# Patient Record
Sex: FEMALE | Race: WHITE | Employment: OTHER | URBAN - METROPOLITAN AREA
[De-identification: names, ages, dates, MRNs, and addresses within clinical notes are randomized per-mention and may not be internally consistent; named-entity substitution may affect disease eponyms.]

---

## 2017-01-17 ENCOUNTER — OFFICE VISIT (OUTPATIENT)
Dept: NEUROLOGY | Facility: CLINIC | Age: 82
End: 2017-01-17

## 2017-01-17 VITALS
HEART RATE: 60 BPM | BODY MASS INDEX: 26.4 KG/M2 | HEIGHT: 63 IN | SYSTOLIC BLOOD PRESSURE: 142 MMHG | DIASTOLIC BLOOD PRESSURE: 84 MMHG | WEIGHT: 149 LBS | RESPIRATION RATE: 18 BRPM

## 2017-01-17 DIAGNOSIS — R41.3 MEMORY CHANGE: ICD-10-CM

## 2017-01-17 DIAGNOSIS — R51.9 HEADACHE, CHRONIC DAILY: Primary | ICD-10-CM

## 2017-01-17 PROCEDURE — 99213 OFFICE O/P EST LOW 20 MIN: CPT | Performed by: OTHER

## 2017-01-17 RX ORDER — FUROSEMIDE 20 MG/1
TABLET ORAL
Refills: 3 | COMMUNITY
Start: 2016-12-30

## 2017-01-17 RX ORDER — NORTRIPTYLINE HYDROCHLORIDE 10 MG/1
20 CAPSULE ORAL NIGHTLY
Qty: 60 CAPSULE | Refills: 4 | Status: SHIPPED | OUTPATIENT
Start: 2017-01-17 | End: 2017-07-05

## 2017-01-17 RX ORDER — TRAVOPROST 0.004 %
DROPS OPHTHALMIC (EYE)
Refills: 3 | COMMUNITY
Start: 2016-11-03

## 2017-01-17 RX ORDER — DULOXETIN HYDROCHLORIDE 30 MG/1
30 CAPSULE, DELAYED RELEASE ORAL DAILY
Qty: 30 CAPSULE | Refills: 3 | Status: SHIPPED | OUTPATIENT
Start: 2017-01-17 | End: 2017-05-11

## 2017-01-17 NOTE — PATIENT INSTRUCTIONS
Refill policies:    • Allow 2 business days for refills; controlled substances may take longer.   • Contact your pharmacy at least 5 days prior to running out of medication and have them send an electronic request or submit request through the “request re your physician has recommended that you have a procedure or additional testing performed. [facility] will contact your insurance carrier to obtain pre-certification or prior authorization.     Unfortunately, OLEGARIO has seen an increas

## 2017-01-17 NOTE — PROGRESS NOTES
Neurology H&P    Sharif Curry Patient Status:  No patient class for patient encounter    1935 MRN [id number]   Location ED [facility] Attending No att. providers found   Hosp Day #  PCP [facility]     Subjective:  Original HPI  A things\" helps to relieve HA pain. She takes tylenol for HA pain which helps relieve pain but does not completely relieve pain.  She has had botox treatment is the past (Q 6 months for past 2 years) which she states have variably made the HA better or worse T PO  D Disp:  Rfl: 3   Clopidogrel Bisulfate 75 MG Oral Tab TK ONE T PO D Disp:  Rfl: 2   Irbesartan 300 MG Oral Tab 1/2 tab bid Disp:  Rfl: 3   DULoxetine HCl 20 MG Oral Cap DR Particles TK ONE C PO  D WITH FOOD Disp:  Rfl: 3   Propranolol HCl  MG facial sensation intact, strong eye closure and lower facial muscles & jaw muscles; palate elevation intact, no dysarthria, no tongue fasciculations or atrophy, strong shoulder shrug.     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 Her son states that she is unchanged since August and memory deficits are minimal so he and pt would like to defer any medications such as Donepezil at this time.  We will increase Cymbalta QHS  - Lipid panel at next visit      Willie Grover DO  Neurology

## 2017-05-11 RX ORDER — DULOXETIN HYDROCHLORIDE 30 MG/1
CAPSULE, DELAYED RELEASE ORAL
Qty: 30 CAPSULE | Refills: 2 | Status: SHIPPED | OUTPATIENT
Start: 2017-05-11

## 2017-05-11 NOTE — TELEPHONE ENCOUNTER
Medication: Cymbalta    Date of last refill: 01/17/17  [place]    Last office visit: 01/17/17  Due back to clinic per last office note:  6 weeks  Date next office visit scheduled:  No appointment scheduled at this ti

## 2017-07-05 DIAGNOSIS — R51.9 HEADACHE, CHRONIC DAILY: ICD-10-CM

## 2017-07-05 NOTE — TELEPHONE ENCOUNTER
Patient's daughter, Sridevi Brown (acceptable per HIPPA), requested the patients son be contacted to set up an appointment. LMTCB.  Please assist the patient in setting up an appointment, in order to process refill request.       Medication: Nortriptyline     D with cardiology   - BP goals <140/90   - Cont.  Atorvastatin 10mg QHS  - Lipid panel at next visit

## 2017-07-10 RX ORDER — NORTRIPTYLINE HYDROCHLORIDE 10 MG/1
CAPSULE ORAL
Qty: 60 CAPSULE | Refills: 0 | Status: SHIPPED | OUTPATIENT
Start: 2017-07-10 | End: 2017-08-02

## 2017-07-21 ENCOUNTER — TELEPHONE (OUTPATIENT)
Dept: NEUROLOGY | Facility: CLINIC | Age: 82
End: 2017-07-21

## 2017-08-02 ENCOUNTER — OFFICE VISIT (OUTPATIENT)
Dept: NEUROLOGY | Facility: CLINIC | Age: 82
End: 2017-08-02

## 2017-08-02 VITALS
HEART RATE: 64 BPM | WEIGHT: 145 LBS | DIASTOLIC BLOOD PRESSURE: 70 MMHG | BODY MASS INDEX: 26 KG/M2 | SYSTOLIC BLOOD PRESSURE: 120 MMHG

## 2017-08-02 DIAGNOSIS — F01.50 VASCULAR DEMENTIA WITHOUT BEHAVIORAL DISTURBANCE (HCC): ICD-10-CM

## 2017-08-02 DIAGNOSIS — R51.9 HEADACHE, CHRONIC DAILY: Primary | ICD-10-CM

## 2017-08-02 PROCEDURE — 99213 OFFICE O/P EST LOW 20 MIN: CPT | Performed by: OTHER

## 2017-08-02 RX ORDER — NORTRIPTYLINE HYDROCHLORIDE 25 MG/1
25 CAPSULE ORAL NIGHTLY
Qty: 90 CAPSULE | Refills: 2 | Status: SHIPPED | OUTPATIENT
Start: 2017-08-02

## 2017-08-02 NOTE — PATIENT INSTRUCTIONS
Refill policies:    • Allow 2-3 business days for refills; controlled substances may take longer.   • Contact your pharmacy at least 5 days prior to running out of medication and have them send an electronic request or submit request through the [facility] have a procedure or additional testing performed. [facility]) will contact your insurance carrier to obtain pre-certification or prior authorization.     Unfortunately, OLEGARIO has seen an increase in denial of payment even though the p

## 2017-08-02 NOTE — PROGRESS NOTES
Neurology H&P    Marck Davila Patient Status:  No patient class for patient encounter    1935 MRN [id number]   Location ED [facility] Attending No att. providers found   Hosp Day #  PCP [facility]     Subjective:  Original HPI  A things\" helps to relieve HA pain. She takes tylenol for HA pain which helps relieve pain but does not completely relieve pain.  She has had botox treatment is the past (Q 6 months for past 2 years) which she states have variably made the HA better or worse MG Oral Tab 1/2 tab bid Disp:  Rfl: 3   Propranolol HCl  MG Oral Capsule SR 24 Hr TK 2 CS PO D Disp:  Rfl: 3   RaNITidine HCl 150 MG Oral Tab TK ONE T PO BID Disp:  Rfl: 5   aspirin 81 MG Oral Tab Take 81 mg by mouth daily.  Disp:  Rfl:        Problem SENSORY EXAMINATION:  UE: intact to light touch  LE: intact to light touch    COORDINATION:  No dysmetria, or intention tremors; mildly slowed HIEN    REFLEXES: difficult to assess due to problems relaxing  1+ at biceps, 1+ triceps, 2+ at patella, unabl - We discussed possible side effects in detail       - Pt will call back to clinic in 5-10 days and let me know how they are tolerating this medication    - Continue Cymbalta 30mg Qday for HA and depression and anxiety  - She is already taking Propa

## 2018-08-13 ENCOUNTER — OFFICE VISIT (OUTPATIENT)
Dept: OBGYN CLINIC | Facility: CLINIC | Age: 83
End: 2018-08-13
Payer: MEDICARE

## 2018-08-13 VITALS
WEIGHT: 151 LBS | DIASTOLIC BLOOD PRESSURE: 73 MMHG | HEART RATE: 57 BPM | BODY MASS INDEX: 27 KG/M2 | SYSTOLIC BLOOD PRESSURE: 120 MMHG

## 2018-08-13 DIAGNOSIS — N95.0 POSTMENOPAUSAL BLEEDING: ICD-10-CM

## 2018-08-13 DIAGNOSIS — R10.2 PELVIC PAIN: Primary | ICD-10-CM

## 2018-08-13 PROCEDURE — 99203 OFFICE O/P NEW LOW 30 MIN: CPT | Performed by: OBSTETRICS & GYNECOLOGY

## 2018-08-17 NOTE — PROGRESS NOTES
Houlton Grayson is a 80year old female  No LMP recorded. Patient is postmenopausal. Patient presents with:  Gyn Problem: VAGINAL PELVIC  PAIN -- patient is my daughter. Has dementia.  States mother told her was having vaginal / inguinal discomfort ONE T PO  D, Disp: , Rfl: 3  •  Clopidogrel Bisulfate 75 MG Oral Tab, TK ONE T PO D, Disp: , Rfl: 2  •  Irbesartan 300 MG Oral Tab, 1/2 tab bid, Disp: , Rfl: 3  •  Propranolol HCl  MG Oral Capsule SR 24 Hr, TK 2 CS PO D, Disp: , Rfl: 3  •  RaNITidine or tenderness  Perineum:   normal  Anus:    no hemorroids   Lymph node:   no inguinal lymph nodes    Assessment & Plan:    Sandra Selby was seen today for gyn problem.     Diagnoses and all orders for this visit:    Pelvic pain  -     US PELVIS EV (TRNS ABD AND

## 2018-08-31 ENCOUNTER — HOSPITAL ENCOUNTER (OUTPATIENT)
Dept: ULTRASOUND IMAGING | Facility: HOSPITAL | Age: 83
Discharge: HOME | End: 2018-08-31
Attending: OBSTETRICS & GYNECOLOGY
Payer: MEDICARE

## 2018-08-31 DIAGNOSIS — R10.2 PELVIC PAIN: ICD-10-CM

## 2018-08-31 DIAGNOSIS — N95.0 POSTMENOPAUSAL BLEEDING: ICD-10-CM

## 2018-08-31 PROCEDURE — 76830 TRANSVAGINAL US NON-OB: CPT | Performed by: OBSTETRICS & GYNECOLOGY

## 2018-09-21 NOTE — PROGRESS NOTES
Please call pt and inform her of results attached -- may need to speak to daughter due to pt's dementia -- thin lining inside uterus. If bleeding occurs again, then consider embx but uncertain if mother will tolerate. They could not seen ovaries.  Patient h

## 2018-11-02 ENCOUNTER — TELEPHONE (OUTPATIENT)
Dept: NEUROLOGY | Facility: CLINIC | Age: 83
End: 2018-11-02

## 2018-11-02 NOTE — TELEPHONE ENCOUNTER
Per Dr. Bing Nick:     \"I called the patients son Tamar Byrd on the phone regarding increased aggression. HE did not answer and there was no answering machine to leave a message.  I will not prescribe any medication until the patient is seen in clinic as I have n

## 2018-11-02 NOTE — TELEPHONE ENCOUNTER
Dx with vascular dementia, personality has seemed to change very angry and mean. Is this behavior something that can be treated by medication? Please advise      LOV:   Assessment:   This is an [de-identified] y/o female with h/o TIA (transient slurred speech and aph

## 2018-11-02 NOTE — TELEPHONE ENCOUNTER
vascular dementia   personality has seemed to change  very angry and mean. Is this behavior something that can be treated by medication? Please advise.

## 2018-11-02 NOTE — TELEPHONE ENCOUNTER
I called the patients son Óscar Ng on the phone regarding increased aggression. HE did not answer and there was no answering machine to leave a message. I will not prescribe any medication until the patient is seen in clinic as I have not seen her in >1 year.

## 2018-11-07 NOTE — TELEPHONE ENCOUNTER
Patient'son notified, verbalized understanding and had no further questions/concerns. States patient has an appt coming up with her pcp so he will discuss at that time and if needed he will call back to schedule a follow up with Dr. Bing Nick.

## (undated) NOTE — MR AVS SNAPSHOT
[address] [facility] [address] [address] [phone]               Thank you for choosing us for your health care visit with David Strong DO.   We are glad to serve you and happy to provide you with [facility] family member will be picking up prescription, office must be given name of individual in advance and they must present an ID as well. ? The name of the person picking up your prescription must be documented in your chart.   Scheduling Tests    If your phy BP Pulse Height Weight BMI    142/84 mmHg 60 63\" 149 lb 26.40 kg/m2         Current Medications          This list is accurate as of: 1/17/17  4:42 PM.  Always use your most recent med list.                alprazolam 0.25 MG Tabs   TK 1 T PO  TID   Commo Lifestyle Modification Recommendations:    Modification Recommendation   Weight Reduction Maintain normal body weight (body mass index 18.5-24.9 kg/m2)   DASH eating plan Adopt a diet rich in fruits, vegetables, and low fat dairy products with reduced cont